# Patient Record
Sex: FEMALE | Race: WHITE | NOT HISPANIC OR LATINO | Employment: OTHER | URBAN - METROPOLITAN AREA
[De-identification: names, ages, dates, MRNs, and addresses within clinical notes are randomized per-mention and may not be internally consistent; named-entity substitution may affect disease eponyms.]

---

## 2018-05-12 ENCOUNTER — HOSPITAL ENCOUNTER (EMERGENCY)
Facility: HOSPITAL | Age: 63
Discharge: HOME | End: 2018-05-12
Attending: EMERGENCY MEDICINE
Payer: MEDICARE

## 2018-05-12 VITALS
RESPIRATION RATE: 12 BRPM | HEIGHT: 63 IN | HEART RATE: 86 BPM | DIASTOLIC BLOOD PRESSURE: 87 MMHG | OXYGEN SATURATION: 100 % | SYSTOLIC BLOOD PRESSURE: 139 MMHG | BODY MASS INDEX: 19.49 KG/M2 | WEIGHT: 110 LBS | TEMPERATURE: 99 F

## 2018-05-12 DIAGNOSIS — M79.10 MYALGIA: Primary | ICD-10-CM

## 2018-05-12 DIAGNOSIS — R53.1 WEAKNESS: ICD-10-CM

## 2018-05-12 LAB
ALBUMIN SERPL BCP-MCNC: 3.9 G/DL
ALP SERPL-CCNC: 62 U/L
ALT SERPL W/O P-5'-P-CCNC: 15 U/L
ANION GAP SERPL CALC-SCNC: 11 MMOL/L
AST SERPL-CCNC: 25 U/L
BASOPHILS # BLD AUTO: 0 K/UL
BASOPHILS NFR BLD: 0.5 %
BILIRUB SERPL-MCNC: 0.5 MG/DL
BUN SERPL-MCNC: 19 MG/DL
CALCIUM SERPL-MCNC: 9.4 MG/DL
CHLORIDE SERPL-SCNC: 106 MMOL/L
CK SERPL-CCNC: 143 U/L
CO2 SERPL-SCNC: 25 MMOL/L
CREAT SERPL-MCNC: 1.1 MG/DL
DIFFERENTIAL METHOD: ABNORMAL
EOSINOPHIL # BLD AUTO: 0.2 K/UL
EOSINOPHIL NFR BLD: 3 %
ERYTHROCYTE [DISTWIDTH] IN BLOOD BY AUTOMATED COUNT: 13.3 %
EST. GFR  (AFRICAN AMERICAN): >60 ML/MIN/1.73 M^2
EST. GFR  (NON AFRICAN AMERICAN): 55 ML/MIN/1.73 M^2
GLUCOSE SERPL-MCNC: 109 MG/DL
HCT VFR BLD AUTO: 35.8 %
HGB BLD-MCNC: 12.1 G/DL
LACTATE SERPL-SCNC: 1.7 MMOL/L
LYMPHOCYTES # BLD AUTO: 2.5 K/UL
LYMPHOCYTES NFR BLD: 35.7 %
MAGNESIUM SERPL-MCNC: 2 MG/DL
MCH RBC QN AUTO: 31.5 PG
MCHC RBC AUTO-ENTMCNC: 33.7 G/DL
MCV RBC AUTO: 94 FL
MONOCYTES # BLD AUTO: 0.4 K/UL
MONOCYTES NFR BLD: 5.1 %
NEUTROPHILS # BLD AUTO: 3.9 K/UL
NEUTROPHILS NFR BLD: 55.7 %
PHOSPHATE SERPL-MCNC: 2.5 MG/DL
PLATELET # BLD AUTO: 276 K/UL
PMV BLD AUTO: 8.5 FL
POTASSIUM SERPL-SCNC: 3.2 MMOL/L
PROT SERPL-MCNC: 6.4 G/DL
RBC # BLD AUTO: 3.83 M/UL
SODIUM SERPL-SCNC: 142 MMOL/L
TSH SERPL DL<=0.005 MIU/L-ACNC: 0.97 UIU/ML
WBC # BLD AUTO: 6.9 K/UL

## 2018-05-12 PROCEDURE — 83735 ASSAY OF MAGNESIUM: CPT

## 2018-05-12 PROCEDURE — 99284 EMERGENCY DEPT VISIT MOD MDM: CPT | Mod: 25

## 2018-05-12 PROCEDURE — 96360 HYDRATION IV INFUSION INIT: CPT

## 2018-05-12 PROCEDURE — 84443 ASSAY THYROID STIM HORMONE: CPT

## 2018-05-12 PROCEDURE — 36415 COLL VENOUS BLD VENIPUNCTURE: CPT

## 2018-05-12 PROCEDURE — 84100 ASSAY OF PHOSPHORUS: CPT

## 2018-05-12 PROCEDURE — 80053 COMPREHEN METABOLIC PANEL: CPT

## 2018-05-12 PROCEDURE — 85025 COMPLETE CBC W/AUTO DIFF WBC: CPT

## 2018-05-12 PROCEDURE — 25000003 PHARM REV CODE 250: Performed by: EMERGENCY MEDICINE

## 2018-05-12 PROCEDURE — 82550 ASSAY OF CK (CPK): CPT

## 2018-05-12 PROCEDURE — 96361 HYDRATE IV INFUSION ADD-ON: CPT

## 2018-05-12 PROCEDURE — 83605 ASSAY OF LACTIC ACID: CPT

## 2018-05-12 RX ORDER — LEVOTHYROXINE SODIUM 75 UG/1
50 TABLET ORAL DAILY
COMMUNITY
End: 2023-08-17

## 2018-05-12 RX ORDER — CLOPIDOGREL BISULFATE 75 MG/1
75 TABLET ORAL DAILY
COMMUNITY
End: 2023-03-27

## 2018-05-12 RX ORDER — IBUPROFEN 800 MG/1
800 TABLET ORAL 3 TIMES DAILY
Qty: 21 TABLET | Refills: 0 | Status: SHIPPED | OUTPATIENT
Start: 2018-05-12 | End: 2018-05-22

## 2018-05-12 RX ORDER — SULFAMETHOXAZOLE AND TRIMETHOPRIM 800; 160 MG/1; MG/1
2 TABLET ORAL 2 TIMES DAILY
Qty: 28 TABLET | Refills: 0 | Status: SHIPPED | OUTPATIENT
Start: 2018-05-12 | End: 2018-05-19

## 2018-05-12 RX ORDER — IRBESARTAN AND HYDROCHLOROTHIAZIDE 150; 12.5 MG/1; MG/1
1 TABLET, FILM COATED ORAL DAILY
COMMUNITY
End: 2018-08-08

## 2018-05-12 RX ORDER — BISOPROLOL FUMARATE 5 MG/1
5 TABLET, FILM COATED ORAL DAILY
COMMUNITY

## 2018-05-12 RX ADMIN — SODIUM CHLORIDE 1000 ML: 0.9 INJECTION, SOLUTION INTRAVENOUS at 03:05

## 2018-05-12 NOTE — ED PROVIDER NOTES
"Encounter Date: 5/12/2018    SCRIBE #1 NOTE: I, Sukhwinder Rios, am scribing for, and in the presence of, Dr. Farrell .       History     Chief Complaint   Patient presents with    multiple complaints     Time seen by provider: 3:13 PM on 05/12/2018    Chief Complaint: Left lung pain     Aneta Hernandez is a 62 y.o. female with a PMHx of mononucleosis who presents to the ED for left lung pain with onset this am. Patient reports that she is having left lung pain that is worse on inspiration. She states that the pain is mild and slight improving. She denies chest pain, abdominal pain, and cough. She reports that she is having "spots all over me." Patient states that the spots appeared after caring for her stepfather with MRSA and her 2 dogs. Patient states that she has been placing hydrogen peroxide on her spots. She states that it is a small reddish area.  She states that it is currently drying out the spots, but healing it. Patient states that she is having a "fluish" fatigued like feeling. She does admit to having some decreased urination with this, stating "I only have some drops coming out." Patient denies pain on urination,chest pain, SOB, abdominal pain, nausea, and vomiting.             The history is provided by the patient.     Review of patient's allergies indicates:  No Known Allergies  No past medical history on file.  No past surgical history on file.  No family history on file.  Social History   Substance Use Topics    Smoking status: Not on file    Smokeless tobacco: Not on file    Alcohol use Not on file     Review of Systems   All other systems reviewed and are negative.  Constitutional:  No fevers, no chills. +fatigue   Eyes: no visual changes  Cardiac: no chest pain. +chest wall pain   Respiratory: no shortness of breath  Abdominal: no abdominal pain, no nausea, no vomiting  Genitourinary: No dysuria, no frequency. +decreased urine output.   Skin: +rash   Heme: no bleeding  Musculoskeletal: no " joint pain  Neuro: no focal numbness, no focal weakness  Pyschological: no depression      Physical Exam     Initial Vitals [05/12/18 1458]   BP Pulse Resp Temp SpO2   139/87 86 12 98.7 °F (37.1 °C) 100 %      MAP       104.33         Physical Exam    Nursing note and vitals reviewed.  Constitutional: She appears well-developed and well-nourished. No distress.   HENT:   Head: Normocephalic and atraumatic.   Eyes: EOM are normal. Pupils are equal, round, and reactive to light.   Neck: Normal range of motion. Neck supple.   Cardiovascular: Normal rate, regular rhythm and normal heart sounds. Exam reveals no gallop and no friction rub.    No murmur heard.  Pulmonary/Chest: Breath sounds normal. No respiratory distress. She has no wheezes. She has no rhonchi. She has no rales.   Abdominal: Soft. She exhibits no distension. There is no tenderness. There is no rebound and no guarding.   Musculoskeletal: Normal range of motion. She exhibits no tenderness.   Neurological: She is alert and oriented to person, place, and time. She has normal strength. No cranial nerve deficit or sensory deficit.   Skin: Skin is warm and dry. No rash noted.   tiny ulceration to the right 2nd knuckle with mild surrounding erythema    Psychiatric: She has a normal mood and affect. Her behavior is normal. Judgment and thought content normal.         ED Course   Procedures  Labs Reviewed   CBC W/ AUTO DIFFERENTIAL - Abnormal; Notable for the following:        Result Value    RBC 3.83 (*)     Hematocrit 35.8 (*)     MCH 31.5 (*)     MPV 8.5 (*)     All other components within normal limits   COMPREHENSIVE METABOLIC PANEL - Abnormal; Notable for the following:     Potassium 3.2 (*)     eGFR if non  55 (*)     All other components within normal limits   PHOSPHORUS - Abnormal; Notable for the following:     Phosphorus 2.5 (*)     All other components within normal limits   MAGNESIUM   TSH   LACTIC ACID, PLASMA   CK        Imaging  Results          X-Ray Chest PA And Lateral (Final result)  Result time 05/12/18 15:51:41    Final result by Deborah Weber MD (05/12/18 15:51:41)                 Impression:      No acute abnormality.      Electronically signed by: Deborah Weber MD  Date:    05/12/2018  Time:    15:51             Narrative:    EXAMINATION:  XR CHEST PA AND LATERAL    CLINICAL HISTORY:  . Weakness    TECHNIQUE:  Single frontal portable view of the chest was performed.    COMPARISON:  None    FINDINGS:  Support devices: None    The lungs are clear, with normal appearance of pulmonary vasculature and no pleural effusion or pneumothorax.    The cardiac silhouette is normal in size. The hilar and mediastinal contours are unremarkable.    Bones are intact.                                   Medical Decision Making:   History:   Old Medical Records: I decided to obtain old medical records.  Differential Diagnosis:   Pneumonia, viral syndrome, rhabdomyolysis, and uti     Patient with diffuse aches and pains of unclear etiology will check basic labs and most likely discharge.     Pt's workup is unremarkable.  Unclear etiology for her diffuse myalgias and weakness.  She is very concerned about a few abrasions to several places on her body and is concerned about MRSA.  2 of the sites look slightly erythematous.  Will dc with bactrim.  Independently Interpreted Test(s):   I have ordered and independently interpreted X-rays - see summary below.       <> Summary of X-Ray Reading(s): CXR: nad  Clinical Tests:   Lab Tests: Ordered and Reviewed  Radiological Study: Ordered and Reviewed            Scribe Attestation:   Scribe #1: I performed the above scribed service and the documentation accurately describes the services I performed. I attest to the accuracy of the note.    I, Dr. Rupert Farrell III, personally performed the services described in this documentation. All medical record entries made by the scribe were at my direction and in my  presence.  I have reviewed the chart and agree that the record reflects my personal performance and is accurate and complete. Rupert Farrell III, MD.  9:33 AM 05/13/2018             Clinical Impression:   The primary encounter diagnosis was Myalgia. A diagnosis of Weakness was also pertinent to this visit.    Disposition:   Disposition: Discharged  Condition: Stable                        Rupert Farrell III, MD  05/13/18 0934

## 2018-07-25 ENCOUNTER — TELEPHONE (OUTPATIENT)
Dept: ORTHOPEDICS | Facility: CLINIC | Age: 63
End: 2018-07-25

## 2018-07-25 NOTE — TELEPHONE ENCOUNTER
Ortho Telephone Triage Message  10:46  Patient C/O: severe R neck and lio shoulder pain X 3 days, as stated per , Christopher Hernandez.  states that they are traveling by car and visiting from [place] in [place], [place]. States [facility] was recommended by others to see pt.  states pt is sleeping at this time and unavailable to speak with Ortho Triage.  states pt had neck surgery about 10 years ago, has seen a Pain Management provider, but  has not seen an Orthopedic provider in a long time.  requests Ortho appt tomorrow.   HX: Myalgia 5/12/18   C-spine fusion X 10 years per  report.   Triage Advice: Advised that pt go to Urgent Care or ED in [place], as needed, for increased pain or other concerns. Advised that  will receive a follow up call regarding available Ortho Spine appts.   Resolution:  states understanding and will await call.

## 2018-07-25 NOTE — TELEPHONE ENCOUNTER
----- Message from Abdullahi Vega MA sent at 7/25/2018  9:21 AM CDT -----  Contact: Pt's  Christopher Hernandez  Pt's  Christopher Hernandez called and would like to schedule a appt regarding his wife Aneta Hernandez regarding pain in her right neck and both arms.There no available appt's that was soon.        Pt's  Christopher Hernandez can be reached at [phone].      Thanks

## 2018-07-25 NOTE — TELEPHONE ENCOUNTER
Ortho Telephone Triage Follow Up Call [phone]  Notified , Christopher, that appt has been scheduled tomorrow, 7/26/18, with IMELDA Andrew PA-C/ [facility] at 3:30pm with arrival at 3:15pm for c/o neck/lio shoulder pain. States understanding. Location, time and directions confirmed with . [facility] contact number provided, if needed.

## 2018-07-26 ENCOUNTER — OFFICE VISIT (OUTPATIENT)
Dept: SPINE | Facility: CLINIC | Age: 63
End: 2018-07-26
Payer: MEDICARE

## 2018-07-26 VITALS
WEIGHT: 119.5 LBS | HEART RATE: 70 BPM | RESPIRATION RATE: 16 BRPM | HEIGHT: 62 IN | DIASTOLIC BLOOD PRESSURE: 80 MMHG | BODY MASS INDEX: 21.99 KG/M2 | SYSTOLIC BLOOD PRESSURE: 132 MMHG

## 2018-07-26 DIAGNOSIS — Z98.890 HX OF CERVICAL SPINE SURGERY: ICD-10-CM

## 2018-07-26 DIAGNOSIS — M54.12 CERVICAL RADICULOPATHY: ICD-10-CM

## 2018-07-26 DIAGNOSIS — M54.2 CERVICALGIA: Primary | ICD-10-CM

## 2018-07-26 PROCEDURE — 99999 PR PBB SHADOW E&M-EST. PATIENT-LVL III: CPT | Mod: PBBFAC,,, | Performed by: PHYSICIAN ASSISTANT

## 2018-07-26 PROCEDURE — 99213 OFFICE O/P EST LOW 20 MIN: CPT | Mod: PBBFAC,PN | Performed by: PHYSICIAN ASSISTANT

## 2018-07-26 PROCEDURE — 99203 OFFICE O/P NEW LOW 30 MIN: CPT | Mod: S$PBB,,, | Performed by: PHYSICIAN ASSISTANT

## 2018-07-26 RX ORDER — TRAZODONE HYDROCHLORIDE 100 MG/1
200 TABLET ORAL NIGHTLY
Refills: 0 | COMMUNITY
Start: 2018-07-05 | End: 2023-03-27
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
## 2018-07-30 ENCOUNTER — PATIENT MESSAGE (OUTPATIENT)
Dept: SPINE | Facility: CLINIC | Age: 63
End: 2018-07-30

## 2018-07-30 ENCOUNTER — TELEPHONE (OUTPATIENT)
Dept: SPINE | Facility: CLINIC | Age: 63
End: 2018-07-30

## 2018-07-30 NOTE — TELEPHONE ENCOUNTER
Mr. Hernandez was calling to have Penny Andrew write a prescription for Hydrocodone for his wife so she did not have to drive to [place] to see her pain management doctor. I explained that Penny does not give pain medication and she would need to see her PCP or pain management doctor, he indicated understanding.

## 2018-07-31 ENCOUNTER — TELEPHONE (OUTPATIENT)
Dept: SPINE | Facility: CLINIC | Age: 63
End: 2018-07-31

## 2018-07-31 NOTE — TELEPHONE ENCOUNTER
----- Message from Kaila Minorza sent at 7/31/2018  3:32 PM CDT -----  Contact:  Reese [phone]  She just saw her pain management doctor in [place], he needs to know what to do next because her doctor in [place] also wants a MRI of her neck.  Where should she have it done.   Please call him.  Thank you!

## 2018-07-31 NOTE — TELEPHONE ENCOUNTER
Spoke with patient's  and let him know Penny is waiting for the disc of the MRI she had done in December to be sent to her here in [place]. She is not planning on ordering another MRI of the neck since Mrs. Hernandez just had one done in December, he indicated understanding. He said the doctor in [place] is ordering a new MRI of the neck because he thinks she has had a change since December.

## 2018-08-06 ENCOUNTER — TELEPHONE (OUTPATIENT)
Dept: SPINE | Facility: CLINIC | Age: 63
End: 2018-08-06

## 2018-08-06 NOTE — TELEPHONE ENCOUNTER
When reviewing and discussing MRIs, I would really prefer to have patients come to clinic to discuss the results so I can show them the images as well.    Please schedule her an appointment to come to clinic if possible to discuss.

## 2018-08-06 NOTE — TELEPHONE ENCOUNTER
Spoke with Mr. Hernandez and scheduled the patient for an appointment to see Penny Andrew 8-8-18, he indicated understanding.

## 2018-08-06 NOTE — TELEPHONE ENCOUNTER
----- Message from Jessica Candelario sent at 8/6/2018  3:32 PM CDT -----  Type:  Test Results    Who Called:  Patient   Name of Test (Lab/Mammo/Etc):  MRI  Date of Test:  Pt delivered disk on 08/02/18  Ordering Provider:  Winifred thorne  [facility]  Best Call Back Number:  [phone]  Additional Information:  Contact patient or spouse Reese Hernandez with results    Thank you

## 2018-08-08 ENCOUNTER — OFFICE VISIT (OUTPATIENT)
Dept: SPINE | Facility: CLINIC | Age: 63
End: 2018-08-08
Payer: MEDICARE

## 2018-08-08 VITALS
HEART RATE: 80 BPM | SYSTOLIC BLOOD PRESSURE: 158 MMHG | BODY MASS INDEX: 21.99 KG/M2 | WEIGHT: 119.5 LBS | DIASTOLIC BLOOD PRESSURE: 96 MMHG | HEIGHT: 62 IN

## 2018-08-08 DIAGNOSIS — M54.12 CERVICAL RADICULOPATHY: ICD-10-CM

## 2018-08-08 DIAGNOSIS — M54.2 CERVICALGIA: Primary | ICD-10-CM

## 2018-08-08 DIAGNOSIS — Z98.890 HX OF CERVICAL SPINE SURGERY: ICD-10-CM

## 2018-08-08 PROCEDURE — 99999 PR PBB SHADOW E&M-EST. PATIENT-LVL IV: CPT | Mod: PBBFAC,,, | Performed by: PHYSICIAN ASSISTANT

## 2018-08-08 PROCEDURE — 99214 OFFICE O/P EST MOD 30 MIN: CPT | Mod: PBBFAC,PN | Performed by: PHYSICIAN ASSISTANT

## 2018-08-08 PROCEDURE — 99214 OFFICE O/P EST MOD 30 MIN: CPT | Mod: S$PBB,,, | Performed by: PHYSICIAN ASSISTANT

## 2018-08-08 RX ORDER — CHLORTHALIDONE 25 MG/1
12.5 TABLET ORAL DAILY
COMMUNITY

## 2018-08-08 RX ORDER — HYDROCODONE BITARTRATE AND ACETAMINOPHEN 10; 325 MG/1; MG/1
1 TABLET ORAL 4 TIMES DAILY
COMMUNITY
End: 2023-03-27

## 2018-08-08 RX ORDER — GABAPENTIN 300 MG/1
300 CAPSULE ORAL 3 TIMES DAILY
COMMUNITY
End: 2023-03-27

## 2018-08-08 RX ORDER — FENTANYL 25 UG/1
1 PATCH TRANSDERMAL
COMMUNITY
End: 2023-03-27

## 2018-08-08 RX ORDER — POTASSIUM CHLORIDE 600 MG/1
8 TABLET, FILM COATED, EXTENDED RELEASE ORAL EVERY OTHER DAY
COMMUNITY
End: 2023-05-15 | Stop reason: SDUPTHER

## 2018-08-08 RX ORDER — ALPRAZOLAM 1 MG/1
1 TABLET ORAL 3 TIMES DAILY PRN
COMMUNITY
End: 2023-03-27

## 2018-08-08 RX ORDER — TEMAZEPAM 30 MG/1
30 CAPSULE ORAL NIGHTLY PRN
COMMUNITY
End: 2023-03-27

## 2018-08-08 RX ORDER — ESTRADIOL 1 MG/1
1 TABLET ORAL DAILY
COMMUNITY
End: 2023-03-27

## 2018-08-08 RX ORDER — HYDRALAZINE HYDROCHLORIDE 10 MG/1
10 TABLET, FILM COATED ORAL DAILY
COMMUNITY

## 2018-08-08 NOTE — PATIENT INSTRUCTIONS
I recommend you see a pain management physician in [place] in addition to physical therapy.    You can also consider seeing a physical medicine and rehabilitation specialists also known as a physiatrist for more focal trigger point injections in the muscles.      We have made you an appointment for one of our pain management physicians here, but I do strongly recommend seeing a provider in the state you will be living close to your home.  Filling out of state narcotic prescriptions (if prescribed) can be difficult at times.
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
## 2023-03-27 ENCOUNTER — OFFICE VISIT (OUTPATIENT)
Dept: FAMILY MEDICINE | Facility: CLINIC | Age: 68
End: 2023-03-27
Payer: MEDICARE

## 2023-03-27 VITALS
HEIGHT: 62 IN | BODY MASS INDEX: 21.62 KG/M2 | OXYGEN SATURATION: 97 % | SYSTOLIC BLOOD PRESSURE: 138 MMHG | WEIGHT: 117.5 LBS | TEMPERATURE: 98 F | HEART RATE: 72 BPM | DIASTOLIC BLOOD PRESSURE: 82 MMHG

## 2023-03-27 DIAGNOSIS — J44.9 CHRONIC OBSTRUCTIVE PULMONARY DISEASE, UNSPECIFIED COPD TYPE: Primary | ICD-10-CM

## 2023-03-27 DIAGNOSIS — I48.20 CHRONIC A-FIB: ICD-10-CM

## 2023-03-27 DIAGNOSIS — N18.9 CHRONIC KIDNEY DISEASE, UNSPECIFIED CKD STAGE: ICD-10-CM

## 2023-03-27 DIAGNOSIS — G44.029 CHRONIC CLUSTER HEADACHE, NOT INTRACTABLE: ICD-10-CM

## 2023-03-27 PROCEDURE — 99204 PR OFFICE/OUTPT VISIT, NEW, LEVL IV, 45-59 MIN: ICD-10-PCS | Mod: ,,, | Performed by: FAMILY MEDICINE

## 2023-03-27 PROCEDURE — 99204 OFFICE O/P NEW MOD 45 MIN: CPT | Mod: ,,, | Performed by: FAMILY MEDICINE

## 2023-03-27 RX ORDER — ALBUTEROL SULFATE 90 UG/1
2 AEROSOL, METERED RESPIRATORY (INHALATION) EVERY 6 HOURS PRN
Qty: 18 G | Refills: 5 | Status: SHIPPED | OUTPATIENT
Start: 2023-03-27

## 2023-03-27 RX ORDER — PANTOPRAZOLE SODIUM 40 MG/1
40 TABLET, DELAYED RELEASE ORAL DAILY
COMMUNITY
Start: 2023-02-20

## 2023-03-27 RX ORDER — ERENUMAB-AOOE 140 MG/ML
140 INJECTION, SOLUTION SUBCUTANEOUS
COMMUNITY

## 2023-03-27 RX ORDER — LOSARTAN POTASSIUM 50 MG/1
50 TABLET ORAL DAILY
COMMUNITY
Start: 2023-02-20

## 2023-03-27 RX ORDER — ESZOPICLONE 3 MG/1
3 TABLET, FILM COATED ORAL NIGHTLY
COMMUNITY

## 2023-03-27 RX ORDER — RISPERIDONE 4 MG/1
4 TABLET ORAL NIGHTLY
COMMUNITY
Start: 2023-02-19

## 2023-03-27 RX ORDER — ROPINIROLE 1 MG/1
1 TABLET, FILM COATED ORAL NIGHTLY
COMMUNITY

## 2023-03-27 RX ORDER — HYDROXYZINE PAMOATE 50 MG/1
25 CAPSULE ORAL 2 TIMES DAILY WITH MEALS
COMMUNITY

## 2023-03-27 RX ORDER — PROPRANOLOL HYDROCHLORIDE 60 MG/1
60 CAPSULE, EXTENDED RELEASE ORAL DAILY
COMMUNITY
Start: 2023-02-20

## 2023-03-27 RX ORDER — ARIPIPRAZOLE 5 MG/1
5 TABLET ORAL DAILY
COMMUNITY
Start: 2023-02-20

## 2023-03-27 RX ORDER — TIZANIDINE 4 MG/1
4 TABLET ORAL EVERY 8 HOURS
COMMUNITY

## 2023-03-27 RX ORDER — DIAZEPAM 10 MG/1
10 TABLET ORAL DAILY PRN
COMMUNITY

## 2023-03-27 NOTE — PROGRESS NOTES
Subjective:       Patient ID: Aneta Hernandez is a 67 y.o. female.    Chief Complaint: Establish Care (Pt new to this area,needs PCP)    Ms. Hernandez is a 67-year-old female who is new to the area, and needs to establish with a PCP in the area. Ms Hernandez has a long history of psychiatric disorders. She Has a psychiatrist already at consult by [facility] [phone]. [address] [place], [place].   She has COPD, Chronic insomnia, Dementia, Blood clot (neck) 2011', HTN, hypothyroid, fibromyalgia, chronic fatigue, and restless legs.     Review of Systems   Constitutional:  Positive for fatigue.   HENT:  Positive for postnasal drip and sinus pressure.    Respiratory:  Positive for cough. Negative for shortness of breath.    Cardiovascular:  Negative for chest pain.   Gastrointestinal:  Negative for diarrhea and nausea.        History of bowel surgery   Musculoskeletal:  Positive for arthralgias.   Hematological:  Bruises/bleeds easily.        On a blood thinner   Psychiatric/Behavioral:  Positive for behavioral problems and sleep disturbance. The patient is nervous/anxious.      There is no problem list on file for this patient.      Objective:      Physical Exam  Constitutional:       Comments: Depressed, Sedated appearing   HENT:      Head: Normocephalic and atraumatic.      Mouth/Throat:      Mouth: Mucous membranes are moist.   Eyes:      Pupils: Pupils are equal, round, and reactive to light.   Cardiovascular:      Rate and Rhythm: Normal rate and regular rhythm.      Heart sounds: No murmur heard.  Pulmonary:      Effort: Pulmonary effort is normal.      Breath sounds: Normal breath sounds.   Abdominal:      Palpations: Abdomen is soft.   Skin:     General: Skin is warm and dry.   Neurological:      General: No focal deficit present.      Mental Status: Mental status is at baseline.   Psychiatric:         Mood and Affect: Mood normal.         Behavior: Behavior normal.         Thought Content: Thought content  "normal.       Lab Results   Component Value Date    WBC 6.90 05/12/2018    HGB 12.1 05/12/2018    HCT 35.8 (L) 05/12/2018     05/12/2018    ALT 15 05/12/2018    AST 25 05/12/2018     05/12/2018    K 3.2 (L) 05/12/2018     05/12/2018    CREATININE 1.1 05/12/2018    BUN 19 05/12/2018    CO2 25 05/12/2018    TSH 0.973 05/12/2018     The ASCVD Risk score (Jose IVERSON, et al., 2019) failed to calculate for the following reasons:    Cannot find a previous HDL lab    Cannot find a previous total cholesterol lab  Visit Vitals  /82 (BP Location: Left arm, Patient Position: Sitting, BP Method: Medium (Manual))   Pulse 72   Temp 98.4 °F (36.9 °C)   Ht 5' 2" (1.575 m)   Wt 53.3 kg (117 lb 8.1 oz)   SpO2 97%   BMI 21.49 kg/m²      Assessment:       1. Chronic obstructive pulmonary disease, unspecified COPD type    2. Chronic cluster headache, not intractable    3. Chronic kidney disease, unspecified CKD stage        Plan:       1. Chronic obstructive pulmonary disease, unspecified COPD type  -     albuterol (VENTOLIN HFA) 90 mcg/actuation inhaler; Inhale 2 puffs into the lungs every 6 (six) hours as needed for Wheezing. Rescue  Dispense: 18 g; Refill: 5    2. Chronic cluster headache, not intractable  -     Ambulatory referral/consult to Neurology; Future; Expected date: 04/03/2023    3. Chronic kidney disease, unspecified CKD stage  -     Ambulatory referral/consult to Nephrology; Future; Expected date: 04/03/2023       Follow up in about 6 months (around 9/27/2023), or if symptoms worsen or fail to improve.           "

## 2023-04-02 ENCOUNTER — PATIENT MESSAGE (OUTPATIENT)
Dept: ADMINISTRATIVE | Facility: HOSPITAL | Age: 68
End: 2023-04-02
Payer: MEDICARE

## 2023-04-05 ENCOUNTER — TELEPHONE (OUTPATIENT)
Dept: FAMILY MEDICINE | Facility: CLINIC | Age: 68
End: 2023-04-05
Payer: MEDICARE

## 2023-04-05 NOTE — TELEPHONE ENCOUNTER
Spoke with patient, [facility] [place] [facility] don't have a psychiatrist, gave patient Smitha Frida number to call [address], [place] [address] ·  [phone]

## 2023-04-05 NOTE — TELEPHONE ENCOUNTER
----- Message from Heather Carvalho sent at 4/5/2023  2:00 PM CDT -----  Regarding: info on psych help  Patient is wanting information about medication for depression. She is wanting to be put on something to help with her depression. Call back number [phone] she is also wanting to speak about Psych help and what doctors she might can see .

## 2023-04-14 ENCOUNTER — TELEPHONE (OUTPATIENT)
Dept: FAMILY MEDICINE | Facility: CLINIC | Age: 68
End: 2023-04-14
Payer: MEDICARE

## 2023-04-14 DIAGNOSIS — F41.9 ANXIETY: Primary | ICD-10-CM

## 2023-04-14 NOTE — TELEPHONE ENCOUNTER
----- Message from Lida Egan sent at 4/14/2023 11:58 AM CDT -----  Patient would like to know if they have any local doctors around here that can do botox injection for headaches .They said they spoke to you about it before , as of now they go to [place] and would like to go somewhere closer . Could you please call the patient and let them know if you can give a referrel for somewhere closer. Phone [phone]. Patient said also they never heard from the doctors that you referred her to .

## 2023-04-14 NOTE — TELEPHONE ENCOUNTER
Called and spoke with patient's spouse. Relayed contact information to him for the two referrals the patient was given most recently. Also resubmitted these referrals via [employer] to their listed providers.     Patient's spouse advises they are needing a referral to a local psych provider. Relayed to patient's spouse that I will consult with Dr. Brar regarding this to see if a referral can be entered for the patient.

## 2023-04-18 ENCOUNTER — TELEPHONE (OUTPATIENT)
Dept: FAMILY MEDICINE | Facility: CLINIC | Age: 68
End: 2023-04-18
Payer: MEDICARE

## 2023-04-18 NOTE — TELEPHONE ENCOUNTER
Referral entered for patient. Submitted referral to [facility] as referred via [facility]. Called patient's spouse to make aware - no answer; left VM requesting phone call back so may relay contact information.

## 2023-04-25 ENCOUNTER — TELEPHONE (OUTPATIENT)
Dept: FAMILY MEDICINE | Facility: CLINIC | Age: 68
End: 2023-04-25
Payer: MEDICARE

## 2023-04-25 DIAGNOSIS — R79.9 ABNORMAL FINDING OF BLOOD CHEMISTRY, UNSPECIFIED: ICD-10-CM

## 2023-04-25 DIAGNOSIS — N18.9 CHRONIC KIDNEY DISEASE, UNSPECIFIED CKD STAGE: Primary | ICD-10-CM

## 2023-04-25 NOTE — TELEPHONE ENCOUNTER
----- Message from Gregorio Cunningham sent at 4/25/2023  3:54 PM CDT -----  Contact: Self  Type: Needs Medical Advice  Who Called:  Spouse/Don  Best Call Back Number:[phone]    Additional Information: Called to follow up with office regarding referral to Dr Olivo's office. States Lab results need to be sent  [phone]    Please call to follow up

## 2023-04-25 NOTE — TELEPHONE ENCOUNTER
Called and spoke with patient's spouse. Relayed to him that most recent labs were not ordered or completed by our office, that they were from an external provider. After discussing, patient's  states he also has a copy of these. Patient's spouse states Dr. Olivo's office will not accept the patient without labs, however there have been no labs performed since February, and these were external.     Patient's spouse is requesting Dr. Brar enter lab orders for patient to have completed so that they may be shared along with the referral to Dr. Olivo's office.

## 2023-04-26 ENCOUNTER — TELEPHONE (OUTPATIENT)
Dept: FAMILY MEDICINE | Facility: CLINIC | Age: 68
End: 2023-04-26
Payer: MEDICARE

## 2023-04-26 ENCOUNTER — PATIENT MESSAGE (OUTPATIENT)
Dept: FAMILY MEDICINE | Facility: CLINIC | Age: 68
End: 2023-04-26
Payer: MEDICARE

## 2023-04-26 ENCOUNTER — LAB VISIT (OUTPATIENT)
Dept: LAB | Facility: CLINIC | Age: 68
End: 2023-04-26
Payer: MEDICARE

## 2023-04-26 DIAGNOSIS — R79.9 ABNORMAL FINDING OF BLOOD CHEMISTRY, UNSPECIFIED: ICD-10-CM

## 2023-04-26 DIAGNOSIS — N18.9 CHRONIC KIDNEY DISEASE, UNSPECIFIED CKD STAGE: ICD-10-CM

## 2023-04-26 LAB
ALBUMIN SERPL BCP-MCNC: 3.2 G/DL (ref 3.5–5.2)
ALBUMIN/CREAT UR: 54.3 UG/MG (ref 0–30)
ALP SERPL-CCNC: 99 U/L (ref 55–135)
ALT SERPL W/O P-5'-P-CCNC: 19 U/L (ref 10–44)
ANION GAP SERPL CALC-SCNC: 14 MMOL/L (ref 8–16)
AST SERPL-CCNC: 17 U/L (ref 10–40)
BASOPHILS # BLD AUTO: 0.15 K/UL (ref 0–0.2)
BASOPHILS NFR BLD: 1.1 % (ref 0–1.9)
BILIRUB SERPL-MCNC: 0.4 MG/DL (ref 0.1–1)
BUN SERPL-MCNC: 17 MG/DL (ref 8–23)
CALCIUM SERPL-MCNC: 6.2 MG/DL (ref 8.7–10.5)
CHLORIDE SERPL-SCNC: 107 MMOL/L (ref 95–110)
CO2 SERPL-SCNC: 21 MMOL/L (ref 23–29)
CREAT SERPL-MCNC: 1.5 MG/DL (ref 0.5–1.4)
CREAT UR-MCNC: 70 MG/DL (ref 15–325)
DIFFERENTIAL METHOD: ABNORMAL
EOSINOPHIL # BLD AUTO: 0.9 K/UL (ref 0–0.5)
EOSINOPHIL NFR BLD: 7.1 % (ref 0–8)
ERYTHROCYTE [DISTWIDTH] IN BLOOD BY AUTOMATED COUNT: 16.3 % (ref 11.5–14.5)
EST. GFR  (NO RACE VARIABLE): 38 ML/MIN/1.73 M^2
ESTIMATED AVG GLUCOSE: 80 MG/DL (ref 68–131)
GLUCOSE SERPL-MCNC: 112 MG/DL (ref 70–110)
HBA1C MFR BLD: 4.4 % (ref 4–5.6)
HCT VFR BLD AUTO: 27.8 % (ref 37–48.5)
HGB BLD-MCNC: 8.7 G/DL (ref 12–16)
IMM GRANULOCYTES # BLD AUTO: 0.08 K/UL (ref 0–0.04)
IMM GRANULOCYTES NFR BLD AUTO: 0.6 % (ref 0–0.5)
LYMPHOCYTES # BLD AUTO: 2.8 K/UL (ref 1–4.8)
LYMPHOCYTES NFR BLD: 21.2 % (ref 18–48)
MCH RBC QN AUTO: 27.4 PG (ref 27–31)
MCHC RBC AUTO-ENTMCNC: 31.3 G/DL (ref 32–36)
MCV RBC AUTO: 87 FL (ref 82–98)
MICROALBUMIN UR DL<=1MG/L-MCNC: 38 UG/ML
MONOCYTES # BLD AUTO: 1 K/UL (ref 0.3–1)
MONOCYTES NFR BLD: 7.7 % (ref 4–15)
NEUTROPHILS # BLD AUTO: 8.2 K/UL (ref 1.8–7.7)
NEUTROPHILS NFR BLD: 62.3 % (ref 38–73)
NRBC BLD-RTO: 0 /100 WBC
PLATELET # BLD AUTO: 653 K/UL (ref 150–450)
PMV BLD AUTO: 10 FL (ref 9.2–12.9)
POTASSIUM SERPL-SCNC: 2 MMOL/L (ref 3.5–5.1)
PROT SERPL-MCNC: 6.7 G/DL (ref 6–8.4)
RBC # BLD AUTO: 3.18 M/UL (ref 4–5.4)
SODIUM SERPL-SCNC: 142 MMOL/L (ref 136–145)
WBC # BLD AUTO: 13.19 K/UL (ref 3.9–12.7)

## 2023-04-26 PROCEDURE — 80053 COMPREHEN METABOLIC PANEL: CPT | Performed by: FAMILY MEDICINE

## 2023-04-26 PROCEDURE — 85025 COMPLETE CBC W/AUTO DIFF WBC: CPT | Performed by: FAMILY MEDICINE

## 2023-04-26 PROCEDURE — 82570 ASSAY OF URINE CREATININE: CPT | Performed by: FAMILY MEDICINE

## 2023-04-26 PROCEDURE — 83036 HEMOGLOBIN GLYCOSYLATED A1C: CPT | Performed by: FAMILY MEDICINE

## 2023-04-27 ENCOUNTER — TELEPHONE (OUTPATIENT)
Dept: FAMILY MEDICINE | Facility: CLINIC | Age: 68
End: 2023-04-27
Payer: MEDICARE

## 2023-04-27 ENCOUNTER — PATIENT MESSAGE (OUTPATIENT)
Dept: FAMILY MEDICINE | Facility: CLINIC | Age: 68
End: 2023-04-27
Payer: MEDICARE

## 2023-04-27 NOTE — TELEPHONE ENCOUNTER
Received call from the lab for critical labs: K 2.0, Ca 6.2. attempted to call both numbers on file with no answer - left voicemail with results and instructions to proceed to ED. Sent [employer] message to patient and sent message to PCP.

## 2023-04-28 ENCOUNTER — TELEPHONE (OUTPATIENT)
Dept: FAMILY MEDICINE | Facility: CLINIC | Age: 68
End: 2023-04-28
Payer: MEDICARE

## 2023-04-28 NOTE — TELEPHONE ENCOUNTER
----- Message from Rosmery Brar MD sent at 4/27/2023  7:29 AM CDT -----  Discussed with patient on telephone last night. She was encouraged to go to ER, also talked to  about the importance of going to ER

## 2023-05-09 ENCOUNTER — OFFICE VISIT (OUTPATIENT)
Dept: FAMILY MEDICINE | Facility: CLINIC | Age: 68
End: 2023-05-09
Payer: MEDICARE

## 2023-05-09 ENCOUNTER — LAB VISIT (OUTPATIENT)
Dept: LAB | Facility: CLINIC | Age: 68
End: 2023-05-09
Payer: MEDICARE

## 2023-05-09 VITALS
TEMPERATURE: 98 F | WEIGHT: 120.56 LBS | HEIGHT: 62 IN | HEART RATE: 76 BPM | SYSTOLIC BLOOD PRESSURE: 130 MMHG | DIASTOLIC BLOOD PRESSURE: 80 MMHG | BODY MASS INDEX: 22.19 KG/M2 | OXYGEN SATURATION: 98 %

## 2023-05-09 DIAGNOSIS — E83.42 HYPOMAGNESEMIA: ICD-10-CM

## 2023-05-09 DIAGNOSIS — J02.9 PHARYNGITIS, UNSPECIFIED ETIOLOGY: ICD-10-CM

## 2023-05-09 DIAGNOSIS — E83.42 HYPOMAGNESEMIA: Primary | ICD-10-CM

## 2023-05-09 LAB
BASOPHILS # BLD AUTO: 0.17 K/UL (ref 0–0.2)
BASOPHILS NFR BLD: 1.9 % (ref 0–1.9)
DIFFERENTIAL METHOD: ABNORMAL
EOSINOPHIL # BLD AUTO: 0.4 K/UL (ref 0–0.5)
EOSINOPHIL NFR BLD: 4.9 % (ref 0–8)
ERYTHROCYTE [DISTWIDTH] IN BLOOD BY AUTOMATED COUNT: 15.6 % (ref 11.5–14.5)
HCT VFR BLD AUTO: 32.7 % (ref 37–48.5)
HGB BLD-MCNC: 9.8 G/DL (ref 12–16)
IMM GRANULOCYTES # BLD AUTO: 0.04 K/UL (ref 0–0.04)
IMM GRANULOCYTES NFR BLD AUTO: 0.4 % (ref 0–0.5)
LYMPHOCYTES # BLD AUTO: 2.2 K/UL (ref 1–4.8)
LYMPHOCYTES NFR BLD: 24.3 % (ref 18–48)
MAGNESIUM SERPL-MCNC: 1.5 MG/DL (ref 1.6–2.6)
MCH RBC QN AUTO: 27.2 PG (ref 27–31)
MCHC RBC AUTO-ENTMCNC: 30 G/DL (ref 32–36)
MCV RBC AUTO: 91 FL (ref 82–98)
MONOCYTES # BLD AUTO: 0.5 K/UL (ref 0.3–1)
MONOCYTES NFR BLD: 5.5 % (ref 4–15)
NEUTROPHILS # BLD AUTO: 5.7 K/UL (ref 1.8–7.7)
NEUTROPHILS NFR BLD: 63 % (ref 38–73)
NRBC BLD-RTO: 0 /100 WBC
PLATELET # BLD AUTO: 472 K/UL (ref 150–450)
PMV BLD AUTO: 10.4 FL (ref 9.2–12.9)
RBC # BLD AUTO: 3.6 M/UL (ref 4–5.4)
WBC # BLD AUTO: 9.02 K/UL (ref 3.9–12.7)

## 2023-05-09 PROCEDURE — 99214 PR OFFICE/OUTPT VISIT, EST, LEVL IV, 30-39 MIN: ICD-10-PCS | Mod: ,,, | Performed by: FAMILY MEDICINE

## 2023-05-09 PROCEDURE — 85025 COMPLETE CBC W/AUTO DIFF WBC: CPT | Performed by: FAMILY MEDICINE

## 2023-05-09 PROCEDURE — 83735 ASSAY OF MAGNESIUM: CPT | Performed by: FAMILY MEDICINE

## 2023-05-09 PROCEDURE — 99214 OFFICE O/P EST MOD 30 MIN: CPT | Mod: ,,, | Performed by: FAMILY MEDICINE

## 2023-05-09 RX ORDER — AMOXICILLIN AND CLAVULANATE POTASSIUM 875; 125 MG/1; MG/1
1 TABLET, FILM COATED ORAL 2 TIMES DAILY
Qty: 20 TABLET | Refills: 0 | Status: SHIPPED | OUTPATIENT
Start: 2023-05-09

## 2023-05-09 RX ORDER — PREDNISONE 10 MG/1
10 TABLET ORAL 2 TIMES DAILY
Qty: 10 TABLET | Refills: 0 | Status: SHIPPED | OUTPATIENT
Start: 2023-05-09

## 2023-05-09 RX ORDER — GUAIFENESIN/DEXTROMETHORPHAN 100-10MG/5
5 SYRUP ORAL EVERY 4 HOURS PRN
Qty: 160 ML | Refills: 0 | Status: SHIPPED | OUTPATIENT
Start: 2023-05-09 | End: 2023-05-19

## 2023-05-09 NOTE — PROGRESS NOTES
Subjective:       Patient ID: Aneta Hernandez is a 67 y.o. female.    Chief Complaint: Follow-up (Hospital f/u 4-27/Also c/o cough, prod. Yellow,sinus pressure,sore throat,right ear pain [id number])    David is a 67-year-old female who is here today with a an acute case of sore throat, cough, sinus congestion and right ear pain.  She states this has been going on for 7-10 days.  She did go to the ER in at the end of April last month for hypo magnesemia, was given magnesium stabilized and discharged.  She states that she also went camping approximately 10 days ago and that is when she felt the sore throat coughing congestion come in on.  We will get a CBC on her today as well as a repeat magnesium level    Follow-up  Associated symptoms include chills, congestion, coughing, a fever and a sore throat. Pertinent negatives include no abdominal pain, chest pain, diaphoresis or rash.   Review of Systems   Constitutional:  Positive for chills and fever. Negative for activity change, appetite change and diaphoresis.   HENT:  Positive for congestion, ear pain, postnasal drip, rhinorrhea, sinus pressure and sore throat.    Respiratory:  Positive for cough. Negative for shortness of breath.    Cardiovascular:  Negative for chest pain, palpitations and leg swelling.   Gastrointestinal:  Negative for abdominal distention and abdominal pain.   Skin:  Negative for color change, rash and wound.     Patient Active Problem List   Diagnosis    Chronic a-fib       Objective:      Physical Exam  Vitals and nursing note reviewed.   Constitutional:       Appearance: She is well-developed.   HENT:      Left Ear: Tympanic membrane and ear canal normal.      Ears:      Comments: Right ear tympanic membrane is dull and injected     Nose: Mucosal edema and rhinorrhea present.      Right Sinus: No maxillary sinus tenderness or frontal sinus tenderness.      Left Sinus: No maxillary sinus tenderness or frontal sinus tenderness.      Mouth/Throat:  "     Pharynx: Posterior oropharyngeal erythema present. No oropharyngeal exudate.      Tonsils: No tonsillar abscesses.   Cardiovascular:      Rate and Rhythm: Normal rate and regular rhythm.      Heart sounds: Normal heart sounds.   Pulmonary:      Effort: Pulmonary effort is normal.      Breath sounds: Normal breath sounds.   Skin:     General: Skin is warm and dry.   Neurological:      Mental Status: Mental status is at baseline.      Comments: Ms Hernandez seems slow today in her responses and in her speech   Psychiatric:         Thought Content: Thought content normal.       Lab Results   Component Value Date    WBC 13.19 (H) 04/26/2023    HGB 8.7 (L) 04/26/2023    HCT 27.8 (L) 04/26/2023     (H) 04/26/2023    ALT 19 04/26/2023    AST 17 04/26/2023     04/26/2023    K 2.0 (LL) 04/26/2023     04/26/2023    CREATININE 1.5 (H) 04/26/2023    BUN 17 04/26/2023    CO2 21 (L) 04/26/2023    TSH 0.973 05/12/2018    HGBA1C 4.4 04/26/2023     The ASCVD Risk score (Jose DK, et al., 2019) failed to calculate for the following reasons:    Cannot find a previous HDL lab    Cannot find a previous total cholesterol lab  Visit Vitals  /80 (BP Location: Left arm, Patient Position: Sitting, BP Method: Medium (Manual))   Pulse 76   Temp 97.8 °F (36.6 °C)   Ht 5' 2" (1.575 m)   Wt 54.7 kg (120 lb 9.5 oz)   SpO2 98%   BMI 22.06 kg/m²      Assessment:       1. Hypomagnesemia    2. Pharyngitis, unspecified etiology        Plan:       1. Hypomagnesemia  -     Magnesium; Future; Expected date: 05/09/2023    2. Pharyngitis, unspecified etiology  -     CBC auto differential; Future; Expected date: 05/09/2023    Other orders  -     amoxicillin-clavulanate 875-125mg (AUGMENTIN) 875-125 mg per tablet; Take 1 tablet by mouth 2 (two) times daily.  Dispense: 20 tablet; Refill: 0  -     predniSONE (DELTASONE) 10 MG tablet; Take 1 tablet (10 mg total) by mouth 2 (two) times daily.  Dispense: 10 tablet; Refill: 0  -     " dextromethorphan-guaiFENesin  mg/5 ml (ROBITUSSIN-DM)  mg/5 mL liquid; Take 5 mLs by mouth every 4 (four) hours as needed.  Dispense: 160 mL; Refill: 0       Follow up in about 6 months (around 11/9/2023), or if symptoms worsen or fail to improve.      Future Appointments       Date Provider Specialty Appt Notes    5/9/2023  Lab lab    9/26/2023 Rosmery Brar MD Family Medicine 6 month ck up thyroid and htn

## 2023-05-11 ENCOUNTER — TELEPHONE (OUTPATIENT)
Dept: FAMILY MEDICINE | Facility: CLINIC | Age: 68
End: 2023-05-11
Payer: MEDICARE

## 2023-05-11 NOTE — TELEPHONE ENCOUNTER
----- Message from Nataliavickey Leon sent at 5/11/2023  9:56 AM CDT -----  Regarding: RX REFILL  Contact: PENNY HOSKINS [phone]      Type: Needs Medical Advice    Who Called:  PENNY HOSKINS    Best Call Back Number: [phone]      Additional Information: Patient is calling to speak with nurse/MA regarding Rx amoxicillin-clavulanate 875-125mg (AUGMENTIN) 875-125 mg per, Rx was oringinal sent to [facility] in [place], [place]. Requesting to be E-prescribe to [facility] in   [place], [place]  Address: [address] [facility]  Phone:[phone]  Please call back and advise. Thanks

## 2023-05-12 ENCOUNTER — TELEPHONE (OUTPATIENT)
Dept: FAMILY MEDICINE | Facility: CLINIC | Age: 68
End: 2023-05-12
Payer: MEDICARE

## 2023-05-12 ENCOUNTER — PATIENT MESSAGE (OUTPATIENT)
Dept: FAMILY MEDICINE | Facility: CLINIC | Age: 68
End: 2023-05-12
Payer: MEDICARE

## 2023-05-12 NOTE — TELEPHONE ENCOUNTER
Last ov: 5/9/23  Last potassium 3.3 on 4/28/23    Asking for refill on potassium to be sent to [facility] in [place]

## 2023-05-12 NOTE — TELEPHONE ENCOUNTER
----- Message from Imani Julian sent at 5/12/2023  2:59 PM CDT -----  Contact: pt  Type:  RX Refill Request    Who Called:  pt  Refill or New Rx:  refill    RX Name and Strength:  please cancel all medication in [place] and resend the pharm below.    1. Potassium  2. amoxicillin-clavulanate 875-125mg (AUGMENTIN) 875-125 mg per tablet  3, dextromethorphan-guaiFENesin  mg/5 ml (ROBITUSSIN-DM)  mg/5 mL liquid    4, predniSONE (DELTASONE) 10 MG tablet    How is the patient currently taking it? (ex. 1XDay):  as prder  Is this a 30 day or 90 day RX:  as order  Preferred Pharmacy with phone number:        [id number] - [place], [place] - [address] AT [facility] [address]  [address]  \[facility] [place] [facility]\"" [address]  Phone: [phone] Fax: [phone]      Local or Mail Order:  local  Ordering Provider:  lola Almaguer Call Back Number:  [phone]    Additional Information:

## 2023-05-12 NOTE — TELEPHONE ENCOUNTER
Called patient to discuss transferring prescriptions to the pharmacy of their choosing - no answer; automated message states call cannot be completed at this time.

## 2023-05-12 NOTE — TELEPHONE ENCOUNTER
Attempted to call patient to discuss transferring prescriptions to the pharmacy of their choosing - no answer; automated message states call cannot be completed at this time.

## 2023-05-15 DIAGNOSIS — E87.6 HYPOKALEMIA: Primary | ICD-10-CM

## 2023-05-15 RX ORDER — POTASSIUM CHLORIDE 600 MG/1
8 TABLET, FILM COATED, EXTENDED RELEASE ORAL EVERY OTHER DAY
Qty: 45 TABLET | Refills: 3 | Status: SHIPPED | OUTPATIENT
Start: 2023-05-15 | End: 2023-07-07 | Stop reason: SDUPTHER

## 2023-05-25 ENCOUNTER — PATIENT MESSAGE (OUTPATIENT)
Dept: ADMINISTRATIVE | Facility: HOSPITAL | Age: 68
End: 2023-05-25
Payer: MEDICARE

## 2023-05-26 ENCOUNTER — PATIENT MESSAGE (OUTPATIENT)
Dept: FAMILY MEDICINE | Facility: CLINIC | Age: 68
End: 2023-05-26
Payer: MEDICARE

## 2023-06-08 ENCOUNTER — TELEPHONE (OUTPATIENT)
Dept: FAMILY MEDICINE | Facility: CLINIC | Age: 68
End: 2023-06-08
Payer: MEDICARE

## 2023-06-08 NOTE — TELEPHONE ENCOUNTER
----- Message from Lizbeth Brown sent at 6/8/2023 10:17 AM CDT -----  Contact: pt  [facility] is calling lab work and chart notes on pt   Please fax paper work  [phone]

## 2023-06-13 ENCOUNTER — TELEPHONE (OUTPATIENT)
Dept: FAMILY MEDICINE | Facility: CLINIC | Age: 68
End: 2023-06-13
Payer: MEDICARE

## 2023-06-13 NOTE — TELEPHONE ENCOUNTER
Spoke to pt.and ,given Dr Owen's phone number and I faxed referral to them.      ----- Message from Gregorio Cunningham sent at 6/13/2023  2:05 PM CDT -----  Contact: [facility]  Type: Needs Medical Advice  Who Called:  [facility]    Best Call Back Number: [phone]  Additional Information: States pt does not meet criteria for care at clinic. Suggests Dr Owen Ph: [phone] Fax [phone]

## 2023-07-07 DIAGNOSIS — E87.6 HYPOKALEMIA: ICD-10-CM

## 2023-07-07 RX ORDER — POTASSIUM CHLORIDE 600 MG/1
8 TABLET, FILM COATED, EXTENDED RELEASE ORAL EVERY OTHER DAY
Qty: 45 TABLET | Refills: 3 | Status: SHIPPED | OUTPATIENT
Start: 2023-07-07

## 2023-07-07 NOTE — TELEPHONE ENCOUNTER
LOV:  5/9/23 Maykel    NOV:  9/26/23 maykel     Preffered Pharmacy:[id number] - [place], [place] - [address] AT [facility] [address]    Last Prescribed:  potassium chloride (KLOR-CON) 8 MEQ TbSR 45 tablet 3 5/15/2023

## 2023-07-07 NOTE — TELEPHONE ENCOUNTER
----- Message from Hira Spencer sent at 7/7/2023  9:36 AM CDT -----  Regarding: Refill questions  Contact: Pts Spouse Don  Type:  RX Refill Request    Who Called: Pt's Spouse Don  Refill or New Rx:refill    RX Name and Strength:potassium chloride (KLOR-CON) 8 MEQ TbSR  How is the patient currently taking it? (ex. 1XDay):1 every other day  Is this a 30 day or 90 day RX:30    Preferred Pharmacy with phone number:  [id number] - [place], [place] - [address] AT [facility] [address]  [address]  [place] [address]  Phone: [phone] Fax: [phone]      Local or Mail Order:Local  Ordering Provider:lola  Would the patient rather a call back or a response via [facility]? Call back  Best Call Back Number:[phone]    Additional Information: Sts he screwed up and wasn't paying attention and was giving her 1 every day vs 1 every other day and so she ran out early.  Wants to know if she should get a new RX or if she needs labs or what needs to be and should be done.  Please advise - Thank you

## 2023-08-03 ENCOUNTER — TELEPHONE (OUTPATIENT)
Dept: FAMILY MEDICINE | Facility: CLINIC | Age: 68
End: 2023-08-03
Payer: MEDICARE

## 2023-08-17 ENCOUNTER — PATIENT MESSAGE (OUTPATIENT)
Dept: FAMILY MEDICINE | Facility: CLINIC | Age: 68
End: 2023-08-17
Payer: MEDICARE

## 2023-08-17 RX ORDER — LEVOTHYROXINE SODIUM 125 UG/1
125 TABLET ORAL
Qty: 90 TABLET | Refills: 1 | Status: SHIPPED | OUTPATIENT
Start: 2023-08-17

## 2023-08-17 RX ORDER — LEVOTHYROXINE SODIUM 125 UG/1
125 TABLET ORAL
COMMUNITY
Start: 2023-02-19 | End: 2023-08-17 | Stop reason: SDUPTHER

## 2023-08-25 ENCOUNTER — PATIENT MESSAGE (OUTPATIENT)
Dept: FAMILY MEDICINE | Facility: CLINIC | Age: 68
End: 2023-08-25
Payer: MEDICARE

## 2023-09-20 DIAGNOSIS — Z78.0 MENOPAUSE: ICD-10-CM

## 2023-10-19 ENCOUNTER — PATIENT MESSAGE (OUTPATIENT)
Dept: ADMINISTRATIVE | Facility: HOSPITAL | Age: 68
End: 2023-10-19
Payer: MEDICARE

## 2025-02-24 NOTE — TELEPHONE ENCOUNTER
----- Message from Natalie Conde sent at 8/6/2018  2:18 PM CDT -----  Call [phone]  Reese Hernandez   .. Asking for results from test done on 08/02    This document is complete and the patient is ready for discharge.